# Patient Record
(demographics unavailable — no encounter records)

---

## 2025-04-28 NOTE — HISTORY OF PRESENT ILLNESS
[FreeTextEntry1] : 63-year-old male with history of Crohn's ileitis maintained on mesalamine for years without recurrence went to ER 10 days ago with acute onset left lower quadrant pain.  CT showed uncomplicated sigmoid diverticulitis.  Was treated with a week of Augmentin and is feeling well.  Family history of colon polyps, last colonoscopy 5 years ago.  Due for 5-year follow-up screening.

## 2025-04-28 NOTE — ASSESSMENT
[FreeTextEntry1] : Impression: Uncomplicated sigmoid diverticulitis responded to antibiotics.  First episode.  History of Crohn's ileitis clinically in remission for many years on maintenance with mesalamine.  Family history of colon polyps due for follow-up screening colonoscopy.  Plan: Gradually advance to high-fiber diet.  Schedule colonoscopy with Suprep.  Need to wait 4 to 6 weeks before exam.